# Patient Record
Sex: FEMALE | Race: WHITE | Employment: UNEMPLOYED | URBAN - METROPOLITAN AREA
[De-identification: names, ages, dates, MRNs, and addresses within clinical notes are randomized per-mention and may not be internally consistent; named-entity substitution may affect disease eponyms.]

---

## 2020-10-02 ENCOUNTER — APPOINTMENT (OUTPATIENT)
Dept: GENERAL RADIOLOGY | Age: 45
DRG: 190 | End: 2020-10-02
Payer: COMMERCIAL

## 2020-10-02 ENCOUNTER — HOSPITAL ENCOUNTER (INPATIENT)
Age: 45
LOS: 3 days | Discharge: HOME | DRG: 190 | End: 2020-10-05
Attending: EMERGENCY MEDICINE | Admitting: INTERNAL MEDICINE
Payer: COMMERCIAL

## 2020-10-02 PROBLEM — I21.4 NSTEMI (NON-ST ELEVATED MYOCARDIAL INFARCTION) (HCC): Status: ACTIVE | Noted: 2020-10-02

## 2020-10-02 LAB
ANION GAP SERPL CALCULATED.3IONS-SCNC: 11 MMOL/L (ref 4–16)
BASOPHILS ABSOLUTE: 0.1 K/CU MM
BASOPHILS RELATIVE PERCENT: 1.4 % (ref 0–1)
BUN BLDV-MCNC: 8 MG/DL (ref 6–23)
CALCIUM SERPL-MCNC: 9 MG/DL (ref 8.3–10.6)
CHLORIDE BLD-SCNC: 105 MMOL/L (ref 99–110)
CO2: 22 MMOL/L (ref 21–32)
CREAT SERPL-MCNC: 0.6 MG/DL (ref 0.6–1.1)
DIFFERENTIAL TYPE: ABNORMAL
EOSINOPHILS ABSOLUTE: 0.1 K/CU MM
EOSINOPHILS RELATIVE PERCENT: 2 % (ref 0–3)
GFR AFRICAN AMERICAN: >60 ML/MIN/1.73M2
GFR NON-AFRICAN AMERICAN: >60 ML/MIN/1.73M2
GLUCOSE BLD-MCNC: 126 MG/DL (ref 70–99)
HCT VFR BLD CALC: 40.5 % (ref 37–47)
HEMOGLOBIN: 13 GM/DL (ref 12.5–16)
IMMATURE NEUTROPHIL %: 0.2 % (ref 0–0.43)
LYMPHOCYTES ABSOLUTE: 2.2 K/CU MM
LYMPHOCYTES RELATIVE PERCENT: 33.8 % (ref 24–44)
MCH RBC QN AUTO: 26.1 PG (ref 27–31)
MCHC RBC AUTO-ENTMCNC: 32.1 % (ref 32–36)
MCV RBC AUTO: 81.3 FL (ref 78–100)
MONOCYTES ABSOLUTE: 0.5 K/CU MM
MONOCYTES RELATIVE PERCENT: 8.1 % (ref 0–4)
PDW BLD-RTO: 13.1 % (ref 11.7–14.9)
PLATELET # BLD: 298 K/CU MM (ref 140–440)
PMV BLD AUTO: 9.5 FL (ref 7.5–11.1)
POTASSIUM SERPL-SCNC: 3.8 MMOL/L (ref 3.5–5.1)
RBC # BLD: 4.98 M/CU MM (ref 4.2–5.4)
SEGMENTED NEUTROPHILS ABSOLUTE COUNT: 3.6 K/CU MM
SEGMENTED NEUTROPHILS RELATIVE PERCENT: 54.5 % (ref 36–66)
SODIUM BLD-SCNC: 138 MMOL/L (ref 135–145)
TOTAL IMMATURE NEUTOROPHIL: 0.01 K/CU MM
TROPONIN T: 0.3 NG/ML
TROPONIN T: 0.33 NG/ML
WBC # BLD: 6.5 K/CU MM (ref 4–10.5)

## 2020-10-02 PROCEDURE — 80048 BASIC METABOLIC PNL TOTAL CA: CPT

## 2020-10-02 PROCEDURE — 1200000000 HC SEMI PRIVATE

## 2020-10-02 PROCEDURE — 85025 COMPLETE CBC W/AUTO DIFF WBC: CPT

## 2020-10-02 PROCEDURE — 84484 ASSAY OF TROPONIN QUANT: CPT

## 2020-10-02 PROCEDURE — 6360000002 HC RX W HCPCS: Performed by: EMERGENCY MEDICINE

## 2020-10-02 PROCEDURE — 96374 THER/PROPH/DIAG INJ IV PUSH: CPT

## 2020-10-02 PROCEDURE — 93005 ELECTROCARDIOGRAM TRACING: CPT | Performed by: EMERGENCY MEDICINE

## 2020-10-02 PROCEDURE — 99285 EMERGENCY DEPT VISIT HI MDM: CPT

## 2020-10-02 PROCEDURE — 96372 THER/PROPH/DIAG INJ SC/IM: CPT

## 2020-10-02 PROCEDURE — 71046 X-RAY EXAM CHEST 2 VIEWS: CPT

## 2020-10-02 PROCEDURE — 6370000000 HC RX 637 (ALT 250 FOR IP): Performed by: EMERGENCY MEDICINE

## 2020-10-02 RX ORDER — ASPIRIN 81 MG/1
324 TABLET, CHEWABLE ORAL ONCE
Status: COMPLETED | OUTPATIENT
Start: 2020-10-02 | End: 2020-10-02

## 2020-10-02 RX ORDER — ORPHENADRINE CITRATE 30 MG/ML
60 INJECTION INTRAMUSCULAR; INTRAVENOUS ONCE
Status: COMPLETED | OUTPATIENT
Start: 2020-10-02 | End: 2020-10-02

## 2020-10-02 RX ADMIN — ENOXAPARIN SODIUM 100 MG: 100 INJECTION SUBCUTANEOUS at 20:52

## 2020-10-02 RX ADMIN — ASPIRIN 81 MG CHEWABLE TABLET 324 MG: 81 TABLET CHEWABLE at 18:37

## 2020-10-02 RX ADMIN — ENOXAPARIN SODIUM 30 MG: 30 INJECTION SUBCUTANEOUS at 20:52

## 2020-10-02 RX ADMIN — ORPHENADRINE CITRATE 60 MG: 30 INJECTION INTRAMUSCULAR; INTRAVENOUS at 18:37

## 2020-10-02 ASSESSMENT — PAIN DESCRIPTION - ORIENTATION: ORIENTATION: LEFT

## 2020-10-02 ASSESSMENT — PAIN DESCRIPTION - LOCATION: LOCATION: CHEST

## 2020-10-02 ASSESSMENT — PAIN SCALES - GENERAL: PAINLEVEL_OUTOF10: 4

## 2020-10-02 ASSESSMENT — PAIN DESCRIPTION - PAIN TYPE: TYPE: ACUTE PAIN

## 2020-10-02 NOTE — ED PROVIDER NOTES
Emergency Department Encounter    Patient: Jud Moctezuma  MRN: [id number]  : 1975  Date of Evaluation: 10/2/2020  ED Provider:  KARI Kumar    Triage Chief Complaint:   Anxiety and Chest Pain (since last evening)      History of Present Illness:  Deandra Moctezuma is a 50 y.o. female that presents with complaint of chest pain and anxiety. She tells me that she believes she had a \"heart attack\" on the evening prior to this presentation. As she was going to bed 1 day ago she developed chest pain in her anterior chest, throughout her sternal region and also on the left side. Pain was sharp, stabbing, and did not radiate. She did also have concomitant pain in her left upper extremity in the shoulder and in the left elbow. She has a history of bilateral ulnar neuropathy, stating it primarily manifests in her right upper extremity. However, the pain in her left arm felt identical to what she usually experiences chronically on her right arm. She did not have any acute weakness. No new numbness or tingling. She feels a sensation of chest tightness and it made her feel as though she could not take a deep breath. She did not have any concomitant vomiting. She did have some nausea. No diaphoresis. No lightheadedness, presyncopal sensation, back pain, or concomitant abdominal pain. Symptoms resolved spontaneously after 45 minutes, but then have recurred at a persistent low or mild level has been constant throughout the entire day today. She has not been experiencing cough. No fevers or chills. She denies any unilateral lower extremity swelling or pain. She is not on oral contraceptive medications. No recent surgeries, history of immobility, or history of neoplasm. She has no personal history of DVT or PE. She is adopted and she does not know her family medical history. She has a history of panic attacks and states that this felt different in some ways and similar in others.   She denies Intimate partner violence     Fear of current or ex partner: Not on file     Emotionally abused: Not on file     Physically abused: Not on file     Forced sexual activity: Not on file   Other Topics Concern    Not on file   Social History Narrative    Not on file       Current Outpatient Medications  No current facility-administered medications for this encounter. No current outpatient medications on file. Allergies  Allergies no known allergies    Nursing Notes Reviewed    Physical Exam:  Triage VS:    ED Triage Vitals   Enc Vitals Group      BP 10/02/20 1755 (!) 148/83      Pulse 10/02/20 1752 93      Resp 10/02/20 1752 16      Temp 10/02/20 1752 97.5 °F (36.4 °C)      Temp src --       SpO2 10/02/20 1752 97 %      Weight 10/02/20 1752 290 lb (131.5 kg)      Height 10/02/20 1752 5' 4\" (1.626 m)      Head Circumference --       Peak Flow --       Pain Score --       Pain Loc --       Pain Edu? --       Excl. in [address]? --        My pulse ox interpretation: Normal    General appearance/Constitutional:  No acute distress. Non-toxic. Alert, cooperative with exam.    HENT:  Atraumatic. Normocephalic. Mucous membranes moist.   Eyes:  Pupils equally round. React normally. Extraocular movements intact. Heart/Cardiovascular:  Normal heart rate. Appears peripherally well perfused. Regular rhythm. Respiratory:   Respirations non-labored. No audible wheezing. Lungs clear to auscultation bilaterally. No crackles. Abdominal/Gastrointestinal:  Soft. Nontender. Obese but non distended. Bowel sounds are present. No masses including no palpable pulsatile abdominal mass. Musculoskeletal:   Neck:  Trachea midline. Movement supple.  Chest Wall: Tender to palpation in the anterior upper pectoral region and the anterior-lateral to the region near the shoulder. No subcutaneous emphysema or bony crepitus   Extremity:  No deformity. No calf swelling or tenderness.   Large joint ROM grossly intact on passive observation. Left shoulder tender to palpation in the anterior and posterior with intact range of motion   Back:   No gross deformity. No bony step off. No midline bony tenderness. Skin:  Warm. Dry. Exposed areas of skin intact. No visible rash/petechiae. No vesicular lesions. Neurological:  Observable cranial nerves appear grossly intact. Motor functions grossly unremarkable. Normal fluent speech. Answers questions appropriately. Normal concentration. Alert and properly oriented. No focal neuro deficits. Psychiatric:   Tearful and anxious affect. Behaviors during ED course appropriate.       I have reviewed and interpreted all of the currently available lab results from this visit:  Results for orders placed or performed during the hospital encounter of 10/02/20   CBC auto diff   Result Value Ref Range    WBC 6.5 4.0 - 10.5 K/CU MM    RBC 4.98 4.2 - 5.4 M/CU MM    Hemoglobin 13.0 12.5 - 16.0 GM/DL    Hematocrit 40.5 37 - 47 %    MCV 81.3 78 - 100 FL    MCH 26.1 (L) 27 - 31 PG    MCHC 32.1 32.0 - 36.0 %    RDW 13.1 11.7 - 14.9 %    Platelets 343 905 - 813 K/CU MM    MPV 9.5 7.5 - 11.1 FL    Differential Type AUTOMATED DIFFERENTIAL     Segs Relative 54.5 36 - 66 %    Lymphocytes % 33.8 24 - 44 %    Monocytes % 8.1 (H) 0 - 4 %    Eosinophils % 2.0 0 - 3 %    Basophils % 1.4 (H) 0 - 1 %    Segs Absolute 3.6 K/CU MM    Lymphocytes Absolute 2.2 K/CU MM    Monocytes Absolute 0.5 K/CU MM    Eosinophils Absolute 0.1 K/CU MM    Basophils Absolute 0.1 K/CU MM    Immature Neutrophil % 0.2 0 - 0.43 %    Total Immature Neutrophil 0.01 K/CU MM   BMP   Result Value Ref Range    Sodium 138 135 - 145 MMOL/L    Potassium 3.8 3.5 - 5.1 MMOL/L    Chloride 105 99 - 110 mMol/L    CO2 22 21 - 32 MMOL/L    Anion Gap 11 4 - 16    BUN 8 6 - 23 MG/DL    CREATININE 0.6 0.6 - 1.1 MG/DL    Glucose 126 (H) 70 - 99 MG/DL    Calcium 9.0 8.3 - 10.6 MG/DL    GFR Non-African American >60 >60 mL/min/1.73m2    GFR African turned over to the oncoming attending physician. Dr. Jermain De Leon will review the patient's lab work and his position her accordingly. Clinical Impression:  1. Acute chest pain    2. Anxiety state    3.  Elevated blood pressure reading        Elecronically Signed By:        Al Cross MD  10/02/20 5510

## 2020-10-02 NOTE — ED PROVIDER NOTES
CARE RECEIVED FROM: Dr. Manasa Shanks  I reviewed the key elements of the history, physical exam and initial treatment plan at the bedside. ANCILLARY DATA:  I reviewed the images. Radiologist interpretation:   XR CHEST (2 VW)   Final Result   No acute process. Labs Reviewed   CBC WITH AUTO DIFFERENTIAL - Abnormal; Notable for the following components:       Result Value    MCH 26.1 (*)     Monocytes % 8.1 (*)     Basophils % 1.4 (*)     All other components within normal limits   BASIC METABOLIC PANEL - Abnormal; Notable for the following components:    Glucose 126 (*)     All other components within normal limits   TROPONIN - Abnormal; Notable for the following components:    Troponin T 0.332 (*)     All other components within normal limits   TROPONIN - Abnormal; Notable for the following components:    Troponin T 0.301 (*)     All other components within normal limits     MEDICAL DECISION MAKING / PLAN:  50 yr old female presents to ED with complaints of chest pain. Patient has been experiencing some intermittent chest pain for approximately 12 to 24 hours. Her longest period of continued pain was approximately 45 minutes but states she does have some pain rating to her left arm. She has history of anxiety as well as obesity and smoking history. Her initial EKG was without acute ischemic changes. At time of signout her troponin had resulted elevated at 0.332 but there was apparently an initial lab error question the accuracy of this. Therefore a repeat troponin has been ordered and patient has been provided with aspirin. The repeat troponin confirms elevated troponin of 0.301. I spoke with Dr. Johnathan Rodas of cardiology who was in agreement with transfer the patient to [facility].  He recommended Lovenox in addition to initiation of beta-blocker. Lovenox was ordered for patient. Cardiology will see the patient in consultation.   I subsequently discussed the case with Dr. Toro Alfaro of the hospitalist team who agreed to accept the patient in transfer. FINAL IMPRESSION:  1. Acute chest pain    2. Anxiety state    3. Elevated blood pressure reading    4. Elevated troponin      ? Electronically signed by:  Melissa Jung, 10/2/2020        Melissa Jung DO  10/02/20 5782

## 2020-10-03 LAB
AMPHETAMINES: NEGATIVE
BARBITURATE SCREEN URINE: NEGATIVE
BENZODIAZEPINE SCREEN, URINE: NEGATIVE
CANNABINOID SCREEN URINE: NEGATIVE
CHOLESTEROL: 181 MG/DL
COCAINE METABOLITE: NEGATIVE
HDLC SERPL-MCNC: 43 MG/DL
LDL CHOLESTEROL DIRECT: 121 MG/DL
LV EF: 50 %
LVEF MODALITY: NORMAL
OPIATES, URINE: NEGATIVE
OXYCODONE: NEGATIVE
PHENCYCLIDINE, URINE: NEGATIVE
TRIGL SERPL-MCNC: 116 MG/DL
TROPONIN T: 0.18 NG/ML
TROPONIN T: 0.23 NG/ML

## 2020-10-03 PROCEDURE — 84484 ASSAY OF TROPONIN QUANT: CPT

## 2020-10-03 PROCEDURE — 2580000003 HC RX 258: Performed by: PHYSICIAN ASSISTANT

## 2020-10-03 PROCEDURE — 36415 COLL VENOUS BLD VENIPUNCTURE: CPT

## 2020-10-03 PROCEDURE — 93306 TTE W/DOPPLER COMPLETE: CPT

## 2020-10-03 PROCEDURE — 1200000000 HC SEMI PRIVATE

## 2020-10-03 PROCEDURE — 99255 IP/OBS CONSLTJ NEW/EST HI 80: CPT | Performed by: INTERNAL MEDICINE

## 2020-10-03 PROCEDURE — 80061 LIPID PANEL: CPT

## 2020-10-03 PROCEDURE — 6370000000 HC RX 637 (ALT 250 FOR IP): Performed by: PHYSICIAN ASSISTANT

## 2020-10-03 PROCEDURE — 80307 DRUG TEST PRSMV CHEM ANLYZR: CPT

## 2020-10-03 PROCEDURE — 94761 N-INVAS EAR/PLS OXIMETRY MLT: CPT

## 2020-10-03 PROCEDURE — 83721 ASSAY OF BLOOD LIPOPROTEIN: CPT

## 2020-10-03 PROCEDURE — 93005 ELECTROCARDIOGRAM TRACING: CPT | Performed by: INTERNAL MEDICINE

## 2020-10-03 RX ORDER — FLUOXETINE HYDROCHLORIDE 20 MG/1
20 CAPSULE ORAL DAILY
COMMUNITY

## 2020-10-03 RX ORDER — PROMETHAZINE HYDROCHLORIDE 25 MG/1
12.5 TABLET ORAL EVERY 6 HOURS PRN
Status: DISCONTINUED | OUTPATIENT
Start: 2020-10-03 | End: 2020-10-05 | Stop reason: HOSPADM

## 2020-10-03 RX ORDER — ACETAMINOPHEN 325 MG/1
650 TABLET ORAL EVERY 6 HOURS PRN
Status: DISCONTINUED | OUTPATIENT
Start: 2020-10-03 | End: 2020-10-05

## 2020-10-03 RX ORDER — ACETAMINOPHEN 650 MG/1
650 SUPPOSITORY RECTAL EVERY 6 HOURS PRN
Status: DISCONTINUED | OUTPATIENT
Start: 2020-10-03 | End: 2020-10-05

## 2020-10-03 RX ORDER — FLUOXETINE HYDROCHLORIDE 20 MG/1
20 CAPSULE ORAL DAILY
Status: DISCONTINUED | OUTPATIENT
Start: 2020-10-03 | End: 2020-10-05 | Stop reason: HOSPADM

## 2020-10-03 RX ORDER — POLYETHYLENE GLYCOL 3350 17 G/17G
17 POWDER, FOR SOLUTION ORAL DAILY PRN
Status: DISCONTINUED | OUTPATIENT
Start: 2020-10-03 | End: 2020-10-05 | Stop reason: HOSPADM

## 2020-10-03 RX ORDER — ONDANSETRON 2 MG/ML
4 INJECTION INTRAMUSCULAR; INTRAVENOUS EVERY 6 HOURS PRN
Status: DISCONTINUED | OUTPATIENT
Start: 2020-10-03 | End: 2020-10-05 | Stop reason: HOSPADM

## 2020-10-03 RX ORDER — NICOTINE 21 MG/24HR
1 PATCH, TRANSDERMAL 24 HOURS TRANSDERMAL DAILY
Status: DISCONTINUED | OUTPATIENT
Start: 2020-10-03 | End: 2020-10-05 | Stop reason: HOSPADM

## 2020-10-03 RX ORDER — CARVEDILOL 3.12 MG/1
3.12 TABLET ORAL 2 TIMES DAILY WITH MEALS
Status: DISCONTINUED | OUTPATIENT
Start: 2020-10-03 | End: 2020-10-05 | Stop reason: HOSPADM

## 2020-10-03 RX ORDER — ASPIRIN 81 MG/1
81 TABLET, CHEWABLE ORAL DAILY
Status: DISCONTINUED | OUTPATIENT
Start: 2020-10-04 | End: 2020-10-05 | Stop reason: HOSPADM

## 2020-10-03 RX ORDER — FAMOTIDINE 20 MG/1
20 TABLET, FILM COATED ORAL 2 TIMES DAILY
Status: DISCONTINUED | OUTPATIENT
Start: 2020-10-03 | End: 2020-10-05 | Stop reason: HOSPADM

## 2020-10-03 RX ORDER — ROSUVASTATIN CALCIUM 20 MG/1
40 TABLET, COATED ORAL NIGHTLY
Status: DISCONTINUED | OUTPATIENT
Start: 2020-10-03 | End: 2020-10-05 | Stop reason: HOSPADM

## 2020-10-03 RX ORDER — POLYETHYLENE GLYCOL 3350 17 G
2 POWDER IN PACKET (EA) ORAL
Status: DISCONTINUED | OUTPATIENT
Start: 2020-10-03 | End: 2020-10-05 | Stop reason: HOSPADM

## 2020-10-03 RX ORDER — SODIUM CHLORIDE 0.9 % (FLUSH) 0.9 %
10 SYRINGE (ML) INJECTION PRN
Status: DISCONTINUED | OUTPATIENT
Start: 2020-10-03 | End: 2020-10-05

## 2020-10-03 RX ORDER — SODIUM CHLORIDE 0.9 % (FLUSH) 0.9 %
10 SYRINGE (ML) INJECTION EVERY 12 HOURS SCHEDULED
Status: DISCONTINUED | OUTPATIENT
Start: 2020-10-03 | End: 2020-10-05

## 2020-10-03 RX ORDER — CALCIUM CARBONATE 200(500)MG
500 TABLET,CHEWABLE ORAL 3 TIMES DAILY PRN
Status: DISCONTINUED | OUTPATIENT
Start: 2020-10-03 | End: 2020-10-05 | Stop reason: HOSPADM

## 2020-10-03 RX ORDER — NITROGLYCERIN 0.4 MG/1
0.4 TABLET SUBLINGUAL EVERY 5 MIN PRN
Status: DISCONTINUED | OUTPATIENT
Start: 2020-10-03 | End: 2020-10-05 | Stop reason: HOSPADM

## 2020-10-03 RX ADMIN — FAMOTIDINE 20 MG: 20 TABLET ORAL at 20:38

## 2020-10-03 RX ADMIN — ROSUVASTATIN CALCIUM 40 MG: 20 TABLET, COATED ORAL at 20:10

## 2020-10-03 RX ADMIN — FLUOXETINE 20 MG: 20 CAPSULE ORAL at 12:31

## 2020-10-03 RX ADMIN — CALCIUM CARBONATE 500 MG: 500 TABLET, CHEWABLE ORAL at 20:38

## 2020-10-03 RX ADMIN — CARVEDILOL 3.12 MG: 3.12 TABLET, FILM COATED ORAL at 12:29

## 2020-10-03 RX ADMIN — SODIUM CHLORIDE, PRESERVATIVE FREE 10 ML: 5 INJECTION INTRAVENOUS at 12:35

## 2020-10-03 RX ADMIN — ROSUVASTATIN CALCIUM 40 MG: 20 TABLET, COATED ORAL at 00:36

## 2020-10-03 RX ADMIN — ACETAMINOPHEN 650 MG: 325 TABLET ORAL at 20:38

## 2020-10-03 ASSESSMENT — PAIN SCALES - GENERAL
PAINLEVEL_OUTOF10: 0
PAINLEVEL_OUTOF10: 4

## 2020-10-03 NOTE — H&P
Hospital Medicine History & Physical      Name:  Dianne Hanley /Age/Sex: 1975  (50 y.o. female)   MRN & CSN:  [id number] & [id number] Admission Date/Time: 10/2/2020  5:48 PM   Location:  [address] [place], [place] [id number] PCP: Juana Winkler MD       Date of Admission: 10/2/2020    Chief Complaint: Anxiety and Chest Pain (since last evening)      History of Present Illness: The patient is a 50 y.o. female with a history of anxiety, depression, nicotine dependence, obesity presents for evaluation of chest pain. Patient states that chest pain began 4 AM 1 night ago. She describes it as significant chest pressure that radiated into her left arm and jaw with associated nausea. No significant diaphoresis or dyspnea with it. Patient continued to have slight pressure in her chest and went to ER in the afternoon. Patient denies any cardiac history. History of anxiety and panic attacks but she states that her chest pain this time was different than any of her previous panic attacks. No history of DVT or PE. No significant dyspnea at this time. She denies any fevers or chills. She was found to have elevated troponin with no associated EKG changes and was transferred to [facility] for further cardiology evaluation. Patient was given therapeutic Lovenox dose as well as aspirin in the ER. At this time, patient describes very mild chest pressure but nothing like she experienced 1 day ago. Patient's past medical, social, and family history have been reviewed. Patient assessment and plan discussed and reviewed with admitting physician:   Dr Jacqueline Lane    Ten point ROS: reviewed negative, unless as noted in above HPI.     Past Medical History:        Diagnosis Date    Anxiety     Depression        Past Surgical History:        Procedure Laterality Date    CHOLECYSTECTOMY         Medications Prior to Admission:    Prior to Admission medications    Not on File       Allergies:    Patient has no known without acute process. The osseous structures are without acute process. No acute process. Labs:  Reviewed  Lab Results:  CBC   Recent Labs     10/02/20  1800   WBC 6.5   HGB 13.0   HCT 40.5         RENAL  Recent Labs     10/02/20  1800      K 3.8      CO2 22   BUN 8   CREATININE 0.6     LFT'S  No results for input(s): AST, ALT, ALB, BILIDIR, BILITOT, ALKPHOS in the last 72 hours. COAG  No results for input(s): INR in the last 72 hours. CARDIAC ENZYMES  No results for input(s): CKTOTAL, CKMB, CKMBINDEX, TROPONINI in the last 72 hours.   U/A:  No results found for: NITRITE, COLORU, WBCUA, RBCUA, MUCUS, BACTERIA, CLARITYU, SPECGRAV, LEUKOCYTESUR, BLOODU, GLUCOSEU, AMORPHOUS  ABG  No results found for: KLN6RJR, BEART, T6QNPFCY, PHART, THGBART, IXJ6CXZ, PO2ART, ZFB9NEG      Medical Decision Making:  NSTEMI  EKG with no acute changes , Troponin positive at 0.3  Given ASA in ER, continue ASA daily  Cardiology consulted - Dr. Judi Singletary who recommended lovenox and BB and transfer to [facility]  Trend troponin  NPO  Check Lipid panel  Pain control with PRN morphine  Tele and pulse oximetry  Started on BB   PRN nitroglycerin    Chronic conditions:  Depression/panic disorder   Continue wellbutrin and prozac  Obesity BMI 49  Nicotine dependence   Counseled on quitting, PRN nicotine patch and gum    DVT Prophylaxis: lovenox  Diet: npo  Code Status: full    Ev Lockhart PA-C  [employer]agen[employer] PA

## 2020-10-03 NOTE — PROGRESS NOTES
The patient seen and examined at bedside. She got admitted overnight with complaints of chest pain. She found to have elevated troponin. Currently getting subq Lovenox. Cardiology recommendations currently pending, per nurse from cardiology likely not getting get cath today.     Electronically signed by Danial Cain MD on 10/3/2020 at 12:52 PM

## 2020-10-03 NOTE — CONSULTS
CARDIOLOGY CONSULT NOTE     Reason for consultation:  Chest pain, elevated troponin     Referring physician:  Karen Dominguez MD     Primary care physician: Allan Chávez MD      Dear  Dr. Karen Dominguez MD   Thanks for the consult. Chief Complaints :  Chief Complaint   Patient presents with    Anxiety    Chest Pain     since last evening        History of present illness:Deandra is a 50 y. o.year old female with past medical history of anxiety and depression, obesity. Pt presented to the ER yesterday with c/o CP  Chest Pain: Started Friday evening, Retrosternal, Pressure like, 8/10, exertional in nature, radiating to left jaw, left shoulder and left side of chest, associated with dyspnea on exertion, relieved with rest     No prior hx of established CAD    Currently denies chest pain, dyspnea or palpitations    In the ER. Noted to have elevated troponin and started on anticoagulation   Pt transferred [facility] overnight     Past medical history:    has a past medical history of Anxiety and Depression. Past surgical history:   has a past surgical history that includes Cholecystectomy. Social History:   reports that she has been smoking. She does not have any smokeless tobacco history on file. She reports that she does not use drugs.   Family history:   no family history of CAD, STROKE of DM at early age    No Known Allergies    FLUoxetine (PROZAC) capsule 20 mg, Daily  sodium chloride flush 0.9 % injection 10 mL, 2 times per day  sodium chloride flush 0.9 % injection 10 mL, PRN  acetaminophen (TYLENOL) tablet 650 mg, Q6H PRN    Or  acetaminophen (TYLENOL) suppository 650 mg, Q6H PRN  polyethylene glycol (GLYCOLAX) packet 17 g, Daily PRN  promethazine (PHENERGAN) tablet 12.5 mg, Q6H PRN    Or  ondansetron (ZOFRAN) injection 4 mg, Q6H PRN  [START ON 10/4/2020] aspirin chewable tablet 81 mg, Daily  nicotine (NICODERM CQ) 14 MG/24HR 1 patch, Daily  nicotine polacrilex (COMMIT) lozenge 2 mg, Q2H PRN  rosuvastatin (CRESTOR) tablet 40 mg, Nightly  carvedilol (COREG) tablet 3.125 mg, BID   [START ON 10/4/2020] enoxaparin (LOVENOX) injection 135 mg, BID  nitroGLYCERIN (NITROSTAT) SL tablet 0.4 mg, Q5 Min PRN      Current Facility-Administered Medications   Medication Dose Route Frequency Provider Last Rate Last Dose    FLUoxetine (PROZAC) capsule 20 mg  20 mg Oral Daily Jackie Mendez PA-C   20 mg at 10/03/20 1231    sodium chloride flush 0.9 % injection 10 mL  10 mL Intravenous 2 times per day ELAINE McbrideC   10 mL at 10/03/20 1235    sodium chloride flush 0.9 % injection 10 mL  10 mL Intravenous PRN Jackie Mendez PA-C        acetaminophen (TYLENOL) tablet 650 mg  650 mg Oral Q6H PRN Bandar Amaya PA-C        Or    acetaminophen (TYLENOL) suppository 650 mg  650 mg Rectal Q6H PRN Bandar Amaya PA-C        polyethylene glycol (GLYCOLAX) packet 17 g  17 g Oral Daily PRN Jackie Mendez PA-C        promethazine (PHENERGAN) tablet 12.5 mg  12.5 mg Oral Q6H PRN Jackie Mendez PA-C        Or    ondansetron (ZOFRAN) injection 4 mg  4 mg Intravenous Q6H PRN Jackie Mendez PA-C        [START ON 10/4/2020] aspirin chewable tablet 81 mg  81 mg Oral Daily Jackie Mendez PA-C        nicotine (NICODERM CQ) 14 MG/24HR 1 patch  1 patch Transdermal Daily Jackie Mendez PA-C        nicotine polacrilex (COMMIT) lozenge 2 mg  2 mg Oral Q2H PRN Jackie Mendez PA-C        rosuvastatin (CRESTOR) tablet 40 mg  40 mg Oral Nightly Jackie Mendez PA-C   40 mg at 10/03/20 0036    carvedilol (COREG) tablet 3.125 mg  3.125 mg Oral BID  Jackie Mendez PA-C   3.125 mg at 10/03/20 1229    [START ON 10/4/2020] enoxaparin (LOVENOX) injection 135 mg  1 mg/kg Subcutaneous BID Jackie Mendez PA-C        nitroGLYCERIN (NITROSTAT) SL tablet 0.4 mg  0.4 mg Sublingual Q5 Min PRN Bandar Amaya PA-C         Review of Systems:   · Constitutional: No Fever or Weight Loss   · Eyes: No Decreased Vision  · ENT: No Headaches, Hearing Loss or Vertigo  · Cardiovascular: As per HPI  · Respiratory: As per HPI  · Gastrointestinal: No abdominal pain, appetite loss, blood in stools, constipation, diarrhea or heartburn  · Genitourinary: No dysuria, trouble voiding, or hematuria  · Musculoskeletal:  No gait disturbance, weakness or joint complaints  · Integumentary: No rash or pruritis  · Neurological: No TIA or stroke symptoms  · Psychiatric: No anxiety or depression  · Endocrine: No malaise, fatigue or temperature intolerance  · Hematologic/Lymphatic: No bleeding problems, blood clots or swollen lymph nodes  · Allergic/Immunologic: No nasal congestion or hives  All systems negative except as marked. Physical Examination:    Vitals:    10/03/20 1215 10/03/20 1234 10/03/20 1417 10/03/20 1445   BP: (!) 162/98  135/77 138/72   Pulse:   78 78   Resp: 14 21 17 16   Temp: 98.1 °F (36.7 °C)  97.8 °F (36.6 °C) 98.5 °F (36.9 °C)   TempSrc: Oral  Oral Oral   SpO2: 96% 96%  94%   Weight:       Height:           General Appearance:  No distress, conversant    Constitutional:  Well developed, Well nourished, No acute distress, Non-toxic appearance. HENT:  Normocephalic, Atraumatic, Bilateral external ears normal, Oropharynx moist, No oral exudates, Nose normal. Neck- Normal range of motion, No tenderness, Supple, No stridor,no apical-carotid delay  Lymphatics : no palpable lymph nodes  Eyes:  PERRL, EOMI, Conjunctiva normal, No discharge. Respiratory:  Normal breath sounds, No respiratory distress, No wheezing, No chest tenderness. ,no use of accessory muscles, crackles Absent   Cardiovascular: (PMI) apex non displaced,no lifts no thrills, ankle swelling Absent  , 1+, s1 and s2 audible,Murmur. Absent , JVD not noted    Abdomen /GI:  Bowel sounds normal, Soft, No tenderness, No masses, No gross visceromegaly   :  No costovertebral angle tenderness   Musculoskeletal:  No edema, no tenderness, no deformities.  Back- no 3. HLD: start on high intensity statins   4. Nicotine Dependence: Smoking cessation councelling. Nicotine patch  5. Morbid Obesity with BMI 49.78 - Risk Factor Modifications       Thank you  much for consult and giving us the opportunity in contributing in the care of this patient. Please feel free to call me for any questions.        Faizan Riggins MD, 10/3/2020 6:16 PM

## 2020-10-03 NOTE — ED NOTES
Report called and given to [address] [place] [address] Rd at [facility]. [facility] crew here to transport pt to [facility] room [id number].       Jillian Maharaj RN  10/02/20 1029

## 2020-10-03 NOTE — PLAN OF CARE
Problem: Pain:  Goal: Pain level will decrease  Description: Pain level will decrease  Outcome: Ongoing  Goal: Control of acute pain  Description: Control of acute pain  Outcome: Ongoing  Goal: Control of chronic pain  Description: Control of chronic pain  Outcome: Ongoing     Problem: Safety:  Goal: Free from accidental physical injury  Description: Free from accidental physical injury  Outcome: Ongoing  Goal: Free from intentional harm  Description: Free from intentional harm  Outcome: Ongoing     Problem: Daily Care:  Goal: Daily care needs are met  Description: Daily care needs are met  Outcome: Ongoing

## 2020-10-04 LAB
EKG ATRIAL RATE: 84 BPM
EKG ATRIAL RATE: 90 BPM
EKG DIAGNOSIS: NORMAL
EKG DIAGNOSIS: NORMAL
EKG P AXIS: 23 DEGREES
EKG P AXIS: 30 DEGREES
EKG P-R INTERVAL: 144 MS
EKG P-R INTERVAL: 148 MS
EKG Q-T INTERVAL: 380 MS
EKG Q-T INTERVAL: 396 MS
EKG QRS DURATION: 76 MS
EKG QRS DURATION: 76 MS
EKG QTC CALCULATION (BAZETT): 464 MS
EKG QTC CALCULATION (BAZETT): 467 MS
EKG R AXIS: 10 DEGREES
EKG R AXIS: 3 DEGREES
EKG T AXIS: 29 DEGREES
EKG T AXIS: 47 DEGREES
EKG VENTRICULAR RATE: 84 BPM
EKG VENTRICULAR RATE: 90 BPM
ESTIMATED AVERAGE GLUCOSE: 128 MG/DL
HBA1C MFR BLD: 6.1 % (ref 4.2–6.3)
HCT VFR BLD CALC: 40.2 % (ref 37–47)
HEMOGLOBIN: 12.6 GM/DL (ref 12.5–16)
MCH RBC QN AUTO: 26.6 PG (ref 27–31)
MCHC RBC AUTO-ENTMCNC: 31.3 % (ref 32–36)
MCV RBC AUTO: 85 FL (ref 78–100)
PDW BLD-RTO: 12.9 % (ref 11.7–14.9)
PLATELET # BLD: 288 K/CU MM (ref 140–440)
PMV BLD AUTO: 9.3 FL (ref 7.5–11.1)
RBC # BLD: 4.73 M/CU MM (ref 4.2–5.4)
TSH HIGH SENSITIVITY: 2.15 UIU/ML (ref 0.27–4.2)
WBC # BLD: 6.3 K/CU MM (ref 4–10.5)

## 2020-10-04 PROCEDURE — 85027 COMPLETE CBC AUTOMATED: CPT

## 2020-10-04 PROCEDURE — 6370000000 HC RX 637 (ALT 250 FOR IP): Performed by: PHYSICIAN ASSISTANT

## 2020-10-04 PROCEDURE — 83036 HEMOGLOBIN GLYCOSYLATED A1C: CPT

## 2020-10-04 PROCEDURE — 6360000002 HC RX W HCPCS: Performed by: PHYSICIAN ASSISTANT

## 2020-10-04 PROCEDURE — 84443 ASSAY THYROID STIM HORMONE: CPT

## 2020-10-04 PROCEDURE — 2580000003 HC RX 258: Performed by: PHYSICIAN ASSISTANT

## 2020-10-04 PROCEDURE — 93010 ELECTROCARDIOGRAM REPORT: CPT | Performed by: INTERNAL MEDICINE

## 2020-10-04 PROCEDURE — 93005 ELECTROCARDIOGRAM TRACING: CPT | Performed by: PHYSICIAN ASSISTANT

## 2020-10-04 PROCEDURE — 99233 SBSQ HOSP IP/OBS HIGH 50: CPT | Performed by: INTERNAL MEDICINE

## 2020-10-04 PROCEDURE — 1200000000 HC SEMI PRIVATE

## 2020-10-04 PROCEDURE — APPNB45 APP NON BILLABLE 31-45 MINUTES: Performed by: NURSE PRACTITIONER

## 2020-10-04 PROCEDURE — 36415 COLL VENOUS BLD VENIPUNCTURE: CPT

## 2020-10-04 PROCEDURE — 94761 N-INVAS EAR/PLS OXIMETRY MLT: CPT

## 2020-10-04 RX ADMIN — ROSUVASTATIN CALCIUM 40 MG: 20 TABLET, COATED ORAL at 21:04

## 2020-10-04 RX ADMIN — ASPIRIN 81 MG CHEWABLE TABLET 81 MG: 81 TABLET CHEWABLE at 08:45

## 2020-10-04 RX ADMIN — CARVEDILOL 3.12 MG: 3.12 TABLET, FILM COATED ORAL at 08:45

## 2020-10-04 RX ADMIN — FAMOTIDINE 20 MG: 20 TABLET ORAL at 08:45

## 2020-10-04 RX ADMIN — FLUOXETINE 20 MG: 20 CAPSULE ORAL at 08:54

## 2020-10-04 RX ADMIN — ENOXAPARIN SODIUM 135 MG: 150 INJECTION SUBCUTANEOUS at 08:45

## 2020-10-04 RX ADMIN — ACETAMINOPHEN 650 MG: 325 TABLET ORAL at 09:02

## 2020-10-04 RX ADMIN — SODIUM CHLORIDE, PRESERVATIVE FREE 10 ML: 5 INJECTION INTRAVENOUS at 08:51

## 2020-10-04 RX ADMIN — FAMOTIDINE 20 MG: 20 TABLET ORAL at 21:03

## 2020-10-04 RX ADMIN — SODIUM CHLORIDE, PRESERVATIVE FREE 10 ML: 5 INJECTION INTRAVENOUS at 21:04

## 2020-10-04 RX ADMIN — CARVEDILOL 3.12 MG: 3.12 TABLET, FILM COATED ORAL at 17:58

## 2020-10-04 ASSESSMENT — PAIN - FUNCTIONAL ASSESSMENT: PAIN_FUNCTIONAL_ASSESSMENT: ACTIVITIES ARE NOT PREVENTED

## 2020-10-04 ASSESSMENT — PAIN SCALES - GENERAL
PAINLEVEL_OUTOF10: 0
PAINLEVEL_OUTOF10: 6
PAINLEVEL_OUTOF10: 0

## 2020-10-04 ASSESSMENT — PAIN DESCRIPTION - PAIN TYPE: TYPE: ACUTE PAIN

## 2020-10-04 ASSESSMENT — PAIN DESCRIPTION - FREQUENCY: FREQUENCY: INTERMITTENT

## 2020-10-04 ASSESSMENT — PAIN DESCRIPTION - ONSET: ONSET: ON-GOING

## 2020-10-04 ASSESSMENT — PAIN DESCRIPTION - DESCRIPTORS: DESCRIPTORS: CONSTANT

## 2020-10-04 ASSESSMENT — PAIN DESCRIPTION - LOCATION: LOCATION: HEAD

## 2020-10-04 NOTE — PROGRESS NOTES
(Last 24 hours) at 10/4/2020 1024  Last data filed at 10/4/2020 7025  Gross per 24 hour   Intake 130 ml   Output --   Net 130 ml       Physical Exam:  Constitutional:  Well developed, Well nourished, No acute distress  HENT:  Normocephalic, Atraumatic, Bilateral external ears normal,  Nose normal.   Neck- trachea is midline  Eyes:  PERRL, Conjunctiva normal  Respiratory:  Normal breath sounds, No respiratory distress, No wheezing, No chest tenderness. Cardiovascular:  Normal heart rate, Normal rhythm, no murmurs appreciated, No rubs appreciated, No gallops appreciated, JVP not elevated  Abdomen/GI:  Soft, No tenderness  Musculoskeletal:  Intact distal pulses, no edema, No tenderness, No cyanosis, No clubbing. Integument:  Warm, Dry  Lymphatic:  No lymphadenopathy noted. Neurologic:  Alert & oriented  Psychiatric:  Affect and Mood :pleasant     Medications:    FLUoxetine  20 mg Oral Daily    sodium chloride flush  10 mL Intravenous 2 times per day    aspirin  81 mg Oral Daily    nicotine  1 patch Transdermal Daily    rosuvastatin  40 mg Oral Nightly    carvedilol  3.125 mg Oral BID WC    enoxaparin  1 mg/kg Subcutaneous BID    famotidine  20 mg Oral BID       sodium chloride flush, acetaminophen **OR** acetaminophen, polyethylene glycol, promethazine **OR** ondansetron, nicotine polacrilex, nitroGLYCERIN, calcium carbonate    Lab Data:  CBC:   Recent Labs     10/02/20  1800 10/04/20  0424   WBC 6.5 6.3   HGB 13.0 12.6   HCT 40.5 40.2   MCV 81.3 85.0    288     BMP:   Recent Labs     10/02/20  1800      K 3.8      CO2 22   BUN 8   CREATININE 0.6     PT/INR: No results for input(s): PROTIME, INR in the last 72 hours. BNP:  No results for input(s): PROBNP in the last 72 hours. TROPONIN:   Recent Labs     10/02/20  1857 10/03/20  0101 10/03/20  0934   TROPONINT 0.301* 0.235* 0.181*        ECHO : 10/3/2020  Technically difficult examination due to body habitus.    Left ventricular systolic function is low normal.   Ejection fraction is visually estimated at 50%. Infero-lateral wall segments are hypokinetic. Indeterminate diastolic function; E/A flow reversal is noted. No evidence of any pericardial effusion. Impression:  Active Problems:    NSTEMI (non-ST elevated myocardial infarction) ([address]    Acute chest pain    Elevated blood pressure reading    Elevated troponin  Resolved Problems:    * No resolved hospital problems. *       All labs, medications and tests reviewed by myself, continue all other medications of all above medical condition listed as is except for changes mentioned above. Thank you   Please call with questions. Electronically signed by Trini Smith. SHERIF Mendez CNP on 10/4/2020 at 10:24 AM         CARDIOLOGY ATTENDING ADDENDUM    I have seen, spoken to and examined this patient personally, independently of the nurse practitioner. I have reviewed the hospital care given to date and reviewed all pertinent labs and imaging. The plan was developed mutually at the time of the visit with the patient,  NP   and myself. I have spoken with patient, nursing staff and provided written and verbal instructions . The above note has been reviewed and I agree with the assessment, diagnosis, and treatment plan with changes made by me as follows       HPI:  I have reviewed the above HPI  And agree with above   Please review addendum/changes made to note above     Interval history:      No acute events overnight  No chest pain   No dyspnea  No Palpitations  Tele: NSR       Physical Exam:  General:  Awake, alert, NAD  Head:normal  Eye:normal  Neck:  No JVD   Chest:  Clear to auscultation, respiration easy  Cardiovascular:  s1s2  Abdomen:   nontender  Extremities:  no edema  Pulses; palpable  Neuro: grossly normal      MEDICAL DECISION MAKING;    NSTEMI  HTN  HLD  Obesity  Nicotine use    Plan     Echo shows wall motion abnormalities  LHC in am  Cont ASA/BB/Statins     I agree with the above plan, which was planned by myself and discussed with NP.     Dr. Andrade Santana MD

## 2020-10-04 NOTE — PROGRESS NOTES
Hospitalist Progress Note      Name:  Flaquito Caldera. Tagg /Age/Sex: 1975  (50 y.o. female)   MRN & CSN:  [id number] & [id number] Admission Date/Time: 10/2/2020  5:48 PM   Location:  [id number]-[id number] PCP: Elizabeth Newton, [address] Day: 3    Assessment and Plan:   Flaquito Caldera. Santi Hill is a 50 y.o.  female  who presents with NSTEMI (non-ST elevated myocardial infarction) ([facility] [address]    1. Non-ST elevation MI:  · Presented with severe left-sided chest pain. · EKG with Q waves on inferior wall, troponin elevated. · Started on therapeutic Lovenox, aspirin, carvedilol and high intensity statin. · For left heart catheterization tomorrow. 2. Depression, panic disorder: Continue medication  3. Morbid obesity  4. Nicotine dependence: Nicotine patch. Diet DIET CARDIAC; Diet NPO, After Midnight   DVT Prophylaxis [x] Lovenox, []  Heparin, [] SCDs, [] Warfarin  [] NOAC     GI Prophylaxis [] PPI,  [x] H2 Blocker,  [] Carafate,  [] Diet/Tube Feeds   Code Status Full Code   MDM [] Low, [x] Moderate,[]  High     History of Present Illness:     Chief Complaint: NSTEMI (non-ST elevated myocardial infarction) ([facility]    Seen and examined today. Denied chest pain or shortness of breath. Has enough back pain, shoulder and elbow joints. Also with wrist pain bilaterally. Denied stiffness for more than 30 minutes. Ten point ROS reviewed negative, unless as noted above    Objective: Intake/Output Summary (Last 24 hours) at 10/4/2020 1610  Last data filed at 10/4/2020 0903  Gross per 24 hour   Intake 120 ml   Output --   Net 120 ml      Vitals:   Vitals:    10/04/20 1351   BP:    Pulse:    Resp:    Temp:    SpO2: 99%     Physical Exam:   GEN Awake female, sitting upright in bed in no apparent distress. Appears given age. Obese  EYES Pupils are equally round. No scleral erythema, discharge, or conjunctivitis. HENT Mucous membranes are moist. Oral pharynx without exudates, no evidence of thrush.   NECK Supple, no apparent thyromegaly or masses. RESP Clear to auscultation, no wheezes, rales or rhonchi. Symmetric chest movement while on room air. CARDIO/VASC S1/S2 auscultated. Regular rate without appreciable murmurs, rubs, or gallops. No JVD or carotid bruits. Peripheral pulses equal bilaterally and palpable. No peripheral edema. GI Abdomen is soft without significant tenderness, masses, or guarding. Bowel sounds are normoactive. Rectal exam deferred. MSK No gross joint deformities. SKIN Normal coloration, warm, dry. NEURO Cranial nerves appear grossly intact, normal speech, no lateralizing weakness. PSYCH Awake, alert, oriented x 4. Affect appropriate.     Medications:   Medications:    FLUoxetine  20 mg Oral Daily    sodium chloride flush  10 mL Intravenous 2 times per day    aspirin  81 mg Oral Daily    nicotine  1 patch Transdermal Daily    rosuvastatin  40 mg Oral Nightly    carvedilol  3.125 mg Oral BID WC    enoxaparin  1 mg/kg Subcutaneous BID    famotidine  20 mg Oral BID      Infusions:   PRN Meds: sodium chloride flush, 10 mL, PRN  acetaminophen, 650 mg, Q6H PRN    Or  acetaminophen, 650 mg, Q6H PRN  polyethylene glycol, 17 g, Daily PRN  promethazine, 12.5 mg, Q6H PRN    Or  ondansetron, 4 mg, Q6H PRN  nicotine polacrilex, 2 mg, Q2H PRN  nitroGLYCERIN, 0.4 mg, Q5 Min PRN  calcium carbonate, 500 mg, TID PRN        Recent Labs     10/02/20  1800 10/04/20  0424   WBC 6.5 6.3   HGB 13.0 12.6   HCT 40.5 40.2    288      Recent Labs     10/02/20  1800      K 3.8      CO2 22   BUN 8   CREATININE 0.6     Recent Labs     10/02/20  1857 10/03/20  0101 10/03/20  0934   TROPONINT 0.301* 0.235* 0.181*        Imaging reviewed      Electronically signed by Radha Jaime MD on 10/4/2020 at 4:10 PM

## 2020-10-05 VITALS
OXYGEN SATURATION: 97 % | DIASTOLIC BLOOD PRESSURE: 89 MMHG | HEIGHT: 64 IN | RESPIRATION RATE: 11 BRPM | HEART RATE: 70 BPM | SYSTOLIC BLOOD PRESSURE: 150 MMHG | BODY MASS INDEX: 49.51 KG/M2 | TEMPERATURE: 97.8 F | WEIGHT: 290 LBS

## 2020-10-05 PROBLEM — R94.39 ABNORMAL STRESS TEST: Status: ACTIVE | Noted: 2020-10-05

## 2020-10-05 LAB
ANION GAP SERPL CALCULATED.3IONS-SCNC: 11 MMOL/L (ref 4–16)
APTT: 28.4 SECONDS (ref 25.1–37.1)
BUN BLDV-MCNC: 14 MG/DL (ref 6–23)
CALCIUM SERPL-MCNC: 8.7 MG/DL (ref 8.3–10.6)
CHLORIDE BLD-SCNC: 104 MMOL/L (ref 99–110)
CO2: 22 MMOL/L (ref 21–32)
CREAT SERPL-MCNC: 0.6 MG/DL (ref 0.6–1.1)
D DIMER: <200 NG/ML(DDU)
EKG ATRIAL RATE: 81 BPM
EKG DIAGNOSIS: NORMAL
EKG P AXIS: 38 DEGREES
EKG P-R INTERVAL: 154 MS
EKG Q-T INTERVAL: 404 MS
EKG QRS DURATION: 74 MS
EKG QTC CALCULATION (BAZETT): 469 MS
EKG R AXIS: 18 DEGREES
EKG T AXIS: 30 DEGREES
EKG VENTRICULAR RATE: 81 BPM
GFR AFRICAN AMERICAN: >60 ML/MIN/1.73M2
GFR NON-AFRICAN AMERICAN: >60 ML/MIN/1.73M2
GLUCOSE BLD-MCNC: 118 MG/DL (ref 70–99)
HCT VFR BLD CALC: 38.9 % (ref 37–47)
HEMOGLOBIN: 11.9 GM/DL (ref 12.5–16)
INR BLD: 0.89 INDEX
LV EF: 53 %
LVEF MODALITY: NORMAL
MCH RBC QN AUTO: 25.8 PG (ref 27–31)
MCHC RBC AUTO-ENTMCNC: 30.6 % (ref 32–36)
MCV RBC AUTO: 84.2 FL (ref 78–100)
PDW BLD-RTO: 12.9 % (ref 11.7–14.9)
PLATELET # BLD: 298 K/CU MM (ref 140–440)
PMV BLD AUTO: 9.6 FL (ref 7.5–11.1)
POTASSIUM SERPL-SCNC: 4.5 MMOL/L (ref 3.5–5.1)
PROTHROMBIN TIME: 10.7 SECONDS (ref 11.7–14.5)
RBC # BLD: 4.62 M/CU MM (ref 4.2–5.4)
SODIUM BLD-SCNC: 137 MMOL/L (ref 135–145)
WBC # BLD: 7.6 K/CU MM (ref 4–10.5)

## 2020-10-05 PROCEDURE — C1894 INTRO/SHEATH, NON-LASER: HCPCS

## 2020-10-05 PROCEDURE — B2111ZZ FLUOROSCOPY OF MULTIPLE CORONARY ARTERIES USING LOW OSMOLAR CONTRAST: ICD-10-PCS | Performed by: INTERNAL MEDICINE

## 2020-10-05 PROCEDURE — 85379 FIBRIN DEGRADATION QUANT: CPT

## 2020-10-05 PROCEDURE — 2709999900 HC NON-CHARGEABLE SUPPLY

## 2020-10-05 PROCEDURE — 86235 NUCLEAR ANTIGEN ANTIBODY: CPT

## 2020-10-05 PROCEDURE — 94761 N-INVAS EAR/PLS OXIMETRY MLT: CPT

## 2020-10-05 PROCEDURE — 85027 COMPLETE CBC AUTOMATED: CPT

## 2020-10-05 PROCEDURE — 85730 THROMBOPLASTIN TIME PARTIAL: CPT

## 2020-10-05 PROCEDURE — 4A023N7 MEASUREMENT OF CARDIAC SAMPLING AND PRESSURE, LEFT HEART, PERCUTANEOUS APPROACH: ICD-10-PCS | Performed by: INTERNAL MEDICINE

## 2020-10-05 PROCEDURE — 86039 ANTINUCLEAR ANTIBODIES (ANA): CPT

## 2020-10-05 PROCEDURE — 86038 ANTINUCLEAR ANTIBODIES: CPT

## 2020-10-05 PROCEDURE — 6360000004 HC RX CONTRAST MEDICATION

## 2020-10-05 PROCEDURE — 86225 DNA ANTIBODY NATIVE: CPT

## 2020-10-05 PROCEDURE — 99233 SBSQ HOSP IP/OBS HIGH 50: CPT | Performed by: INTERNAL MEDICINE

## 2020-10-05 PROCEDURE — 93010 ELECTROCARDIOGRAM REPORT: CPT | Performed by: INTERNAL MEDICINE

## 2020-10-05 PROCEDURE — 93458 L HRT ARTERY/VENTRICLE ANGIO: CPT

## 2020-10-05 PROCEDURE — 85610 PROTHROMBIN TIME: CPT

## 2020-10-05 PROCEDURE — 80048 BASIC METABOLIC PNL TOTAL CA: CPT

## 2020-10-05 PROCEDURE — B2161ZZ FLUOROSCOPY OF RIGHT AND LEFT HEART USING LOW OSMOLAR CONTRAST: ICD-10-PCS | Performed by: INTERNAL MEDICINE

## 2020-10-05 PROCEDURE — 6370000000 HC RX 637 (ALT 250 FOR IP): Performed by: PHYSICIAN ASSISTANT

## 2020-10-05 PROCEDURE — 93458 L HRT ARTERY/VENTRICLE ANGIO: CPT | Performed by: INTERNAL MEDICINE

## 2020-10-05 PROCEDURE — 86430 RHEUMATOID FACTOR TEST QUAL: CPT

## 2020-10-05 PROCEDURE — 6360000002 HC RX W HCPCS

## 2020-10-05 PROCEDURE — 2500000003 HC RX 250 WO HCPCS

## 2020-10-05 PROCEDURE — 36415 COLL VENOUS BLD VENIPUNCTURE: CPT

## 2020-10-05 RX ORDER — SODIUM CHLORIDE 0.9 % (FLUSH) 0.9 %
10 SYRINGE (ML) INJECTION PRN
Status: DISCONTINUED | OUTPATIENT
Start: 2020-10-05 | End: 2020-10-05 | Stop reason: HOSPADM

## 2020-10-05 RX ORDER — ACETAMINOPHEN 325 MG/1
650 TABLET ORAL EVERY 4 HOURS PRN
Status: DISCONTINUED | OUTPATIENT
Start: 2020-10-05 | End: 2020-10-05 | Stop reason: HOSPADM

## 2020-10-05 RX ORDER — SODIUM CHLORIDE 9 MG/ML
INJECTION, SOLUTION INTRAVENOUS CONTINUOUS
Status: DISCONTINUED | OUTPATIENT
Start: 2020-10-05 | End: 2020-10-05 | Stop reason: HOSPADM

## 2020-10-05 RX ORDER — SODIUM CHLORIDE 0.9 % (FLUSH) 0.9 %
10 SYRINGE (ML) INJECTION EVERY 12 HOURS SCHEDULED
Status: DISCONTINUED | OUTPATIENT
Start: 2020-10-05 | End: 2020-10-05 | Stop reason: HOSPADM

## 2020-10-05 RX ORDER — NICOTINE 21 MG/24HR
1 PATCH, TRANSDERMAL 24 HOURS TRANSDERMAL DAILY
Qty: 30 PATCH | Refills: 3 | Status: SHIPPED | OUTPATIENT
Start: 2020-10-06

## 2020-10-05 RX ORDER — FAMOTIDINE 20 MG/1
20 TABLET, FILM COATED ORAL 2 TIMES DAILY
Qty: 60 TABLET | Refills: 3 | Status: SHIPPED | OUTPATIENT
Start: 2020-10-05

## 2020-10-05 RX ORDER — CARVEDILOL 3.12 MG/1
3.12 TABLET ORAL 2 TIMES DAILY WITH MEALS
Qty: 60 TABLET | Refills: 3 | Status: SHIPPED | OUTPATIENT
Start: 2020-10-05

## 2020-10-05 RX ADMIN — CARVEDILOL 3.12 MG: 3.12 TABLET, FILM COATED ORAL at 08:44

## 2020-10-05 ASSESSMENT — PAIN SCALES - GENERAL
PAINLEVEL_OUTOF10: 0
PAINLEVEL_OUTOF10: 0

## 2020-10-05 NOTE — PLAN OF CARE
Problem: Pain:  Goal: Pain level will decrease  Description: Pain level will decrease  Outcome: Ongoing  Goal: Control of acute pain  Description: Control of acute pain  Outcome: Ongoing  Goal: Control of chronic pain  Description: Control of chronic pain  Outcome: Ongoing     Problem: Safety:  Goal: Free from accidental physical injury  Description: Free from accidental physical injury  Outcome: Ongoing  Goal: Free from intentional harm  Description: Free from intentional harm  Outcome: Ongoing     Problem: Daily Care:  Goal: Daily care needs are met  Description: Daily care needs are met  Outcome: Ongoing     Problem: Discharge Planning:  Goal: Patients continuum of care needs are met  Description: Patients continuum of care needs are met  Outcome: Ongoing     Problem: Discharge Planning:  Goal: Patients continuum of care needs are met  Description: Patients continuum of care needs are met  Outcome: Ongoing     Problem: Daily Care:  Goal: Daily care needs are met  Description: Daily care needs are met  Outcome: Ongoing

## 2020-10-05 NOTE — PROGRESS NOTES
Cardiology Progress Note       ADDENDUM 10/5/2020 1:25 PM    [facility] normal   Pt can be d/c from cardiology standpoint  outpt follow up     Procedure Summary   1. Angiographically normal coronary arteries. 2. Normal LV systolic function. LVEF is 50 to 55 %. Patient tolerated the procedure well. Blood loss: 10 cc. No immediate complications. Recommendations   Medical therapy as needed.    =========================      Today's Plan:    Left heart cardiac catheterization    Admit Date:  10/2/2020    Consult reason/ Seen today for: CP    Subjective and  Overnight Events:  Denies any cardiac complaints or concerns. Assessment / Plan / Recommendation:     1. NSTEMI : anticoagulation with full dose Lovenox, ASA, Coreg, statins - [facility] today. Pt agreeable for [facility] , risks benefits explained, consents signed, NPO after midnight . Infero-lateral wall segments are hypokinetic on echo. 2. HTN: tolerating  Coreg   3. HLD: start on high intensity statins   4. Nicotine Dependence: Smoking cessation councelling. Nicotine patch  5. Morbid Obesity with BMI 49.78 - Risk Factor Modifications    ECHO:     Technically difficult examination due to body habitus. Left ventricular systolic function is low normal.   Ejection fraction is visually estimated at 50%. Infero-lateral wall segments are hypokinetic. Indeterminate diastolic function; E/A flow reversal is noted. No evidence of any pericardial effusion. History of Presenting Illness:    Chief complain on admission : 50 y. o.year old who is admitted for  Chief Complaint   Patient presents with    Anxiety    Chest Pain     since last evening        Past medical history:    has a past medical history of Anxiety and Depression. Past surgical history:   has a past surgical history that includes Cholecystectomy. Social History:   reports that she has been smoking.  She does not have any smokeless tobacco history on file. She reports that she does not use drugs. Family history:  family history is not on file. No Known Allergies    Review of Systems:  Review of Systems   All other systems reviewed and are negative. BP (!) 152/107   Pulse 76   Temp 98.3 °F (36.8 °C) (Oral)   Resp 17   Ht 5' 4\" (1.626 m)   Wt 290 lb (131.5 kg)   LMP 09/27/2020   SpO2 98%   BMI 49.78 kg/m²     No intake or output data in the 24 hours ending 10/05/20 1126    Physical Exam:  Constitutional:  Well developed, Well nourished, No acute distress  HENT:  Normocephalic, Atraumatic, Bilateral external ears normal,  Nose normal.   Neck- trachea is midline  Eyes:  Conjunctiva normal  Respiratory:  Normal breath sounds, No respiratory distress, No wheezing, No chest tenderness. Cardiovascular:  Normal heart rate, Normal rhythm, no murmurs appreciated, No rubs appreciated, No gallops appreciated, JVP not elevated   Abdomen/GI:  Soft, No tenderness  Musculoskeletal:  Intact distal pulses, no edema, No tenderness, No cyanosis, No clubbing. Integument:  Warm, Dry  Lymphatic:  No lymphadenopathy noted.    Neurologic:  Alert & oriented  Psychiatric:  Affect and Mood :pleasant     Medications:    influenza virus vaccine  0.5 mL Intramuscular Once    FLUoxetine  20 mg Oral Daily    sodium chloride flush  10 mL Intravenous 2 times per day    aspirin  81 mg Oral Daily    nicotine  1 patch Transdermal Daily    rosuvastatin  40 mg Oral Nightly    carvedilol  3.125 mg Oral BID WC    enoxaparin  1 mg/kg Subcutaneous BID    famotidine  20 mg Oral BID       sodium chloride flush, acetaminophen **OR** acetaminophen, polyethylene glycol, promethazine **OR** ondansetron, nicotine polacrilex, nitroGLYCERIN, calcium carbonate    Lab Data:  CBC:   Recent Labs     10/02/20  1800 10/04/20  0424 10/05/20  0414   WBC 6.5 6.3 7.6   HGB 13.0 12.6 11.9*   HCT 40.5 40.2 38.9   MCV 81.3 85.0 84.2    288 298     BMP: Recent Labs     10/02/20  1800 10/05/20  0414    137   K 3.8 4.5    104   CO2 22 22   BUN 8 14   CREATININE 0.6 0.6     PT/INR:   Recent Labs     10/05/20  0414   PROTIME 10.7*   INR 0.89     BNP:  No results for input(s): PROBNP in the last 72 hours. TROPONIN:   Recent Labs     10/02/20  1857 10/03/20  0101 10/03/20  0934   TROPONINT 0.301* 0.235* 0.181*        ECHO : 10/3/2020  Technically difficult examination due to body habitus. Left ventricular systolic function is low normal.   Ejection fraction is visually estimated at 50%. Infero-lateral wall segments are hypokinetic. Indeterminate diastolic function; E/A flow reversal is noted. No evidence of any pericardial effusion. Impression:  Principal Problem:    NSTEMI (non-ST elevated myocardial infarction) ([address]  Active Problems:    Acute chest pain    Elevated blood pressure reading    Elevated troponin  Resolved Problems:    * No resolved hospital problems. *       All labs, medications and tests reviewed by myself, continue all other medications of all above medical condition listed as is except for changes mentioned above.       Electronically signed by Cezar Hall MD on 10/5/2020 at 11:26 AM

## 2020-10-05 NOTE — PROGRESS NOTES
Procedure site ______RFA Tegaderm_______    Time patient arrived to room: 1320    Hematoma noted? No    Upon arrival to patient's room, procedure site assessed by:     ____________H. Molina Ramirez RN____________  And ___________Fadumo SHEIKH on [address]____________        Bedside report given to Fadumo SHEIKH on [place] [id number]. Call light within reach. Bed in the lowest position.

## 2020-10-05 NOTE — DISCHARGE SUMMARY
Wt 290 lb (131.5 kg)   LMP 09/27/2020   SpO2 97%   BMI 49.78 kg/m²            PHYSICAL EXAM   GEN Awake female, sitting upright in bed in no apparent distress. Appears given age. EYES Pupils are equally round. No scleral erythema, discharge, or conjunctivitis. HENT Mucous membranes are moist. Oral pharynx without exudates, no evidence of thrush. NECK Supple, no apparent thyromegaly or masses. RESP Clear to auscultation, no wheezes, rales or rhonchi. Symmetric chest movement while on room air. CARDIO/VASC S1/S2 auscultated. Regular rate without appreciable murmurs, rubs, or gallops. No JVD or carotid bruits. Peripheral pulses equal bilaterally and palpable. No peripheral edema. GI Abdomen is soft without significant tenderness, masses, or guarding. Bowel sounds are normoactive. Rectal exam deferred.  No costovertebral angle tenderness. Normal appearing external genitalia. Fletcher catheter is not present. HEME/LYMPH No palpable cervical lymphadenopathy and no hepatosplenomegaly. No petechiae or ecchymoses. MSK No gross joint deformities. SKIN Normal coloration, warm, dry. NEURO Cranial nerves appear grossly intact, normal speech, no lateralizing weakness. PSYCH Awake, alert, oriented x 4. Affect appropriate. BMP/CBC  Recent Labs     10/02/20  1800 10/04/20  0424 10/05/20  0414     --  137   K 3.8  --  4.5     --  104   CO2 22  --  22   BUN 8  --  14   CREATININE 0.6  --  0.6   WBC 6.5 6.3 7.6   HCT 40.5 40.2 38.9    288 298       IMAGING:  Xr Chest (2 Vw)    Result Date: 10/2/2020  EXAMINATION: TWO XRAY VIEWS OF THE CHEST 10/2/2020 6:21 pm COMPARISON: None. HISTORY: ORDERING SYSTEM PROVIDED HISTORY: Chest pain TECHNOLOGIST PROVIDED HISTORY: Reason for exam:->Chest pain FINDINGS: The lungs are without acute focal process. There is no effusion or pneumothorax. The cardiomediastinal silhouette is without acute process. The osseous structures are without acute process.      No acute process.      Discharge Time of 20 minutes    Electronically signed by Josias Powell MD on 10/5/2020 at 4:47 PM

## 2020-10-05 NOTE — CARE COORDINATION
Chart Review:  Pt lives with family. Pt is independent. Pt has a PCP and has insurance to help with meds. No needs are identified at this time. CM available if needed.

## 2020-10-07 LAB
ANTI-NUCLEAR ANTIBODY (ANA): DETECTED
RHEUMATOID FACTOR: <10 IU/ML (ref 0–14)

## 2020-10-08 LAB
ANA PATTERN: NORMAL
ANA TITER: NORMAL
ANTINUCLEAR ANTIBODY, HEP-2, IGG: DETECTED
INTERPRETATION: ABNORMAL

## 2020-10-09 LAB — ANTI DNA DOUBLE STRANDED: NORMAL

## 2020-10-10 LAB
ANTI JO ANTIBODY: 0 AU/ML (ref 0–40)
ANTI RNP AB: 2 AU/ML (ref 0–40)
ENA TO SMITH (SM) ANTIBODY IGG: 0 AU/ML (ref 0–40)
ENA TO SSA (RO) ANTIBODY: 1 AU/ML (ref 0–40)
ENA TO SSB (LA) ANTIBODY: 0 AU/ML (ref 0–40)
SCLERODERMA (SCL-70) AB: 1 AU/ML (ref 0–40)
SSA 60 RO IGG ANTIBODY: 0 AU/ML (ref 0–40)